# Patient Record
(demographics unavailable — no encounter records)

---

## 2025-06-02 NOTE — HISTORY OF PRESENT ILLNESS
[FreeTextEntry1] : Mr. Logan Hanley is a 50-year-old man who presents today for a neurological consultation with a history of having had a stroke in 2012, when he developed numbness on the right side of body including his arm, leg, and face. He was hospitalize for two weeks at [facility], and he was told he had a stroke which improved spontaneously. He has had no recurrence of symptoms since. He does not have high blood pressure, is not on any medications, and is not a diabetic. He had bilateral rotator cuff surgery. He does not drink or smoke.

## 2025-06-02 NOTE — PHYSICAL EXAM
[FreeTextEntry1] :  PHYSICAL EXAMINATION: Head: Normocephalic, atraumatic. Negative TA tenderness/prominence.   Neck: Supple with full range of motion; nontender with negative bilateral Spurling's signs.   Spine: Full range of motion; nontender. Negative straight leg raise maneuvers.   Extremities: Non-tender. Atraumatic. Negative Tinel's signs.   NEUROLOGICAL EXAMINATION:   Mental Status: Patient is a good informant with intact orientation, attention, concentration, recent and remote memory. Language evaluation reveals no evidence of aphasia. Fund of knowledge is normal.   Cranial Nerves Cranial Nerves:   II, III, IV, VI: Pupils are equal, round, and reactive to light and accommodation. No evidence of afferent pupillary defect. Visual fields are full to confrontation. Eye movements are full without evidence of nystagmus or internuclear ophthalmoplegia. Funduscopic examination reveals sharp disc margins.   V: Normal jaw movements. Normal facial sensation.   VII: Normal facial motor testing.   VIII: Grossly normal hearing bilaterally.   IX, X: Palate moves symmetrically. No dysarthria.   XI: Normal shoulder shrug and sternocleidomastoid power.   XII: Tongue appears normal and protrudes in the midline.   Motor: Normal bulk, tone, and power throughout.   Muscle Stretch Reflexes (right/left): 2+ symmetrical.   Plantar Responses: Flexor bilaterally.   Coordination: Normal finger to nose and heel to shin testing, no truncal ataxia and no tremor.   Sensation: Normal primary sensation. Normal double simultaneous stimulation.   Gait and Station: Normal base, stride, and turning. Normal toe and heel walking. Normal tandem. Negative Romberg.

## 2025-06-02 NOTE — ASSESSMENT
[FreeTextEntry1] : Impression is of a history of left middle cerebral territory infarct with complete resolution. I am ordering an MRI of the brain to ascertain the severity of residual damage to the left hemisphere. We will see him again in follow-up for re-evaluation.    Total clinician time spent today on the patient is 45 minutes including preparing to see the patient, obtaining and/or reviewing and confirming history, performing medically necessary and appropriate examination, counseling and educating the patient and/or family, documenting clinical information in the EHR and communicating and/or referring to other healthcare professionals.  Entered by Tamra Amin acting as scribe for Dr. Horowitz.   The documentation recorded by the scribe, in my presence, accurately reflects the service I personally performed, and the decisions made by me with my edits as appropriate. Jeovanny Horowitz MD, FAAN, FACP Diplomate [employer] Board [employer]iatry & Neurology.

## 2025-07-07 NOTE — ASSESSMENT
[FreeTextEntry1] : Impression is of CVA, old left middle cerebral infarct. Consequently, since he is not having any additional symptoms, there's no necessity to keep imaging this remote infarct in the basal ganglia. He is being followed by his internist, and I suggested that he try to get his blood pressure closer to 130/80. No additional neurological investigation is felt to be necessary.    Entered by Fany Lawson acting as scribe for Dr. Horowitz.     The documentation recorded by the scribe, in my presence, accurately reflects the service I personally performed, and the decisions made by me with my edits as appropriate. Jeovanny Horowitz MD, FAAN, FACP Diplomate [employer] Psychiatry & Neurology.

## 2025-07-07 NOTE — HISTORY OF PRESENT ILLNESS
[FreeTextEntry1] : Mr. Logan Hanley is a 50-year-old man who presents today for a neurological follow-up with a history of having had a stroke in 2012, when he developed numbness on the right side of body including his arm, leg, and face. He was hospitalized for two weeks at [facility], and he was told he had a stroke which improved spontaneously. He has had no recurrence of symptoms since. He does not have high blood pressure, is not on any medications, and is not a diabetic. He had bilateral rotator cuff surgery. He does not drink or smoke.  He is not having any new symptoms. MRI of the brain done on 6/16/2025 showed cavitation in the left caudate nucleus consistent with a remote stroke which occurred in 2012. It also involves the globus pallidus and putamen. There is no acute or recent change, or any hemorrhage. When he had the stroke in 2012, he had a cardiac workup which was negative. His blood pressure was 140/87 on the last visit.